# Patient Record
Sex: MALE | NOT HISPANIC OR LATINO | URBAN - METROPOLITAN AREA
[De-identification: names, ages, dates, MRNs, and addresses within clinical notes are randomized per-mention and may not be internally consistent; named-entity substitution may affect disease eponyms.]

---

## 2021-02-02 ENCOUNTER — CONVERSION ENCOUNTER (OUTPATIENT)
Dept: OTHER | Facility: HOSPITAL | Age: 56
End: 2021-02-02

## 2021-02-02 ENCOUNTER — OFFICE VISIT CONVERTED (OUTPATIENT)
Dept: CARDIOLOGY | Facility: CLINIC | Age: 56
End: 2021-02-02
Attending: INTERNAL MEDICINE

## 2021-05-10 NOTE — H&P
History and Physical      Patient Name: Davin Harris   Patient ID: [id number]   Sex: Male   YOB: 1965    Primary Care Provider: Krystle COLEMAN   Referring Provider: Krystle COLEMAN    Visit Date: February 2, 2021    Provider: Del Dobbins MD   Location: [facility]   Location Address: [address] [place] [address]  [id number]          History Of Present Illness  Consult requested by: Krystle COLEMAN   Davin Harris is a 55 year old male that I saw in the office today. He has no previous cardiac history. Over the past 1-2 months he has had intermittent dizziness described as lightheadedness, moderately intense. It has occurred at rest, somewhat occurring after rising from a seated position. He has had intermittent vague chest discomfort, left neck and jaw discomfort as well. He has also reported forgetfulness and sometimes fogginess in his thinking, low energy level, and shortness of breath with exertion. he had a stress test in July 2020 for atypical chest pain, which was negative. His baseline EKG recently showed no ischemic ST changes. He stays relatively active and goes to the gym often.   PAST MEDICAL HISTORY: Hypertension. Surgeries include back surgery and appendectomy.   PSYCHOSOCIAL HISTORY: Previous tobacco use but quit 6 years ago. Denies drinking alcohol. Moderate caffeine use. Changes in mood and depression.   FAMILY HISTORY: Positive for hypertension.   CURRENT MEDICATIONS: Lisinopril 20 mg daily; diclofenac 75 mg p.r.n. The dosage and frequency of the medications were reviewed with the patient.   ALLERGIES: No known drug allergies.       Review of Systems  · Constitutional  o Admits  o : fatigue, good general health lately  o Denies  o : recent weight changes   · Eyes  o Denies  o : double vision  · HENT  o Denies  o : hearing loss or ringing, chronic sinus problem, swollen glands in neck  · Cardiovascular  o Admits  o :  "chest pain, shortness of breath while walking or lying flat  o Denies  o : palpitations (fast, fluttering, or skipping beats), swelling (feet, ankles, hands)  · Respiratory  o Admits  o : COPD  o Denies  o : chronic or frequent cough, asthma or wheezing  · Gastrointestinal  o Denies  o : ulcers, nausea or vomiting  · Neurologic  o Admits  o : lightheaded or dizzy, headaches  o Denies  o : stroke  · Musculoskeletal  o Admits  o : back pain  o Denies  o : joint pain  · Endocrine  o Denies  o : thyroid disease, diabetes, heat or cold intolerance, excessive thirst or urination  · Heme-Lymph  o Denies  o : bleeding or bruising tendency, anemia      Vitals  Date Time BP Position Site L\R Cuff Size HR RR TEMP (F) WT  HT  BMI kg/m2 BSA m2 O2 Sat FR L/min FiO2 HC       02/02/2021 01:29 /78 Sitting    71 - R   176lbs 6oz 5'  10\" 25.31 1.99             Physical Examination  · Constitutional  o Appearance  o : Normal white male, pleasant, in no acute distress.  · Head and Face  o HEENT  o : No pallor, anicteric. Eyes normal. Moist mucous membranes.  · Neck  o Inspection/Palpation  o : Supple. No hepatosplenomegaly.  o Jugular Veins  o : No JVD. No carotid bruits.  · Respiratory  o Auscultation of Lungs  o : Clear to auscultation bilaterally. No crackles or wheezing.  · Cardiovascular  o Heart  o : S1, S2 is normally heard. No S3. No murmur, rubs, or gallops.  · Gastrointestinal  o Abdominal Examination  o : Soft, non-distended. No palpable hepatosplenomegaly. Bowel sounds heard in all four quadrants.  · Musculoskeletal  o General  o : Normal muscle tone and strength.  · Skin and Subcutaneous Tissue  o General Inspection  o : No skin rashes.  · Extremities  o Extremities  o : Warm and well perfused. Distal pulses present. No pitting pedal edema.  · EKG  o EKG  o : His EKG from January showed sinus rhythm, rate 63, no ST changes, no previous for comparison.   · Labs  o Labs  o : His most recent LDL is 118. Basic labs are " otherwise normal.      Nuclear stress test in July 2020 showed no evidence of ischemia, with low normal ejection fraction.     Primary care records reviewed.              Assessment     Constellation of symptoms, somewhat difficult to unify including dizziness, lack of energy, fatigue, somewhat forgetful, atypical chest pain.  The symptoms do not seem to be specifically related to physical exertion but more random in occurrence.  They have worsened over the past two months.  His cardiac risk factors include hypertension and former smoking.       Plan     Based on the patient's symptomatology, more definitive cardiac workup is recommended.  I have ordered an echocardiogram to rule out any structural abnormalities.  If he has no structural abnormalities, coronary angiography will be recommended for definitive assessment for coronary artery disease.  I have discussed this with the patient and he is agreeable to proceed.  We will begin with an echocardiogram and go from there. He has also been referred to Neurology as well as Neurosurgery I believe for further evaluation of some of his symptoms.  Further followup will be determined based on the patient's workup.     Thank you for this consultation.     RON Dobbins MD  CBD/rt                Electronically Signed by: Mae Kern-, Other -Author on February 16, 2021 02:12:43 PM  Electronically Co-signed by: Del Dobbins MD -Reviewer on February 17, 2021 09:04:10 AM

## 2021-05-14 VITALS
HEART RATE: 71 BPM | BODY MASS INDEX: 25.25 KG/M2 | DIASTOLIC BLOOD PRESSURE: 78 MMHG | HEIGHT: 70 IN | WEIGHT: 176.37 LBS | SYSTOLIC BLOOD PRESSURE: 114 MMHG

## 2023-05-16 NOTE — PROGRESS NOTES
Chief Complaint        Chronic Diarrhea/Delay in gastric emptying    History of Present Illness      Davin Harris is a 58 y.o. male who presents to [facility] as a new patient with a history of Flynn's esophagus, lower abdominal pain, constipation, personal history of colon polyps, and history of alcohol abuse.  Patient reports previous EGD performed 1-1/2 years ago and was diagnosed with Flynn's esophagus with no follow-up in place.  He reports last colonoscopy was performed 1-1/2 years ago and had polyps removed and was told to repeat colonoscopy in 3 years.  Patient had a gastric emptying study performed which showed delay in our 1 but normal otherwise.  Patient reports that he is on omeprazole 20 mg daily and takes about 15 times per day.  Patient was a previous alcoholic but has been sober since 2013.  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  Patient reports he lives a really healthy lifestyle now and goes to gym 3 to 7 days/week and eats very small meals.  He does report some abdominal bloating and constipation.  He uses Dulcolax every 3 to 4 days to have bowel movements and has for multiple years.  Patient is a previous smoker and quit 7 years ago.  Patient reports lower abdominal pain with constipation.  Patient denies fever, nausea, vomiting, weight loss, night sweats, melena, hematochezia, hematemesis.    No colon or EGD on file for this patient, reported at [facility] hospital performed by Dr. Simpson 1-1/2 years ago with colon polyps removed and Flynn's esophagus noted for the first time on EGD.      Results       Result Review :   The following data was reviewed by: JARED Casey on 05/19/2023                        Past Medical History       History reviewed. No pertinent past medical history.    Past Surgical History:   Procedure Laterality Date   • APPENDECTOMY     • BACK SURGERY     • COLONOSCOPY         •  "COSMETIC SURGERY      tummy tuck   • UPPER GASTROINTESTINAL ENDOSCOPY               Current Outpatient Medications:   •  aspirin 81 MG EC tablet, aspirin, Disp: , Rfl:   •  bisacodyl 5 MG EC tablet, , Disp: , Rfl:   •  diclofenac (VOLTAREN) 75 MG EC tablet, diclofenac sodium 75 mg tablet,delayed release  TAKE 1 TABLET BY MOUTH TWICE DAILY AS NEEDED, Disp: , Rfl:   •  Lactobacillus Rhamnosus, GG, (MOMMY'S BLISS PROBIOTIC 15 DAY PO), Probiotic 15 billion cell sprinkle capsule  TAKE 1 CAPSULE BY MOUTH EVERY MORNING, Disp: , Rfl:   •  lisinopril (PRINIVIL,ZESTRIL) 10 MG tablet, lisinopril 10 mg tablet, Disp: , Rfl:   •  tadalafil (CIALIS) 2.5 MG tablet, Daily., Disp: , Rfl:   •  Umeclidinium-Vilanterol (Anoro Ellipta) 62.5-25 MCG/ACT aerosol powder  inhaler, Anoro Ellipta 62.5 mcg-25 mcg/actuation powder for inhalation  Inhale 1 puff every day by inhalation route as directed., Disp: , Rfl:   •  linaclotide (Linzess) 72 MCG capsule capsule, Take 1 capsule by mouth Every Morning Before Breakfast., Disp: 8 capsule, Rfl: 0     Allergies   Allergen Reactions   • Shellfish Allergy Anaphylaxis       Family History   Problem Relation Age of Onset   • Colon cancer Neg Hx         Social History     Social History Narrative   • Not on file       Objective       Objective     Vital Signs:   /82 (BP Location: Right arm, Patient Position: Sitting, Cuff Size: Adult)   Pulse 62   Ht 180.3 cm (71\")   Wt 82.6 kg (182 lb)   SpO2 100%   BMI 25.38 kg/m²     Body mass index is 25.38 kg/m².    Physical Exam  Constitutional:       General: He is not in acute distress.     Appearance: Normal appearance. He is well-developed and normal weight.   Eyes:      Conjunctiva/sclera: Conjunctivae normal.      Pupils: Pupils are equal, round, and reactive to light.      Visual Fields: Right eye visual fields normal and left eye visual fields normal.   Cardiovascular:      Rate and Rhythm: Normal rate and regular rhythm.      Heart " sounds: Normal heart sounds.   Pulmonary:      Effort: Pulmonary effort is normal. No retractions.      Breath sounds: Normal breath sounds and air entry.      Comments: Inspection of chest: normal appearance  Abdominal:      General: Bowel sounds are normal.      Palpations: Abdomen is soft.      Tenderness: There is no abdominal tenderness.      Comments: No appreciable hepatosplenomegaly   Musculoskeletal:      Cervical back: Neck supple.      Right lower leg: No edema.      Left lower leg: No edema.   Lymphadenopathy:      Cervical: No cervical adenopathy.   Skin:     Findings: No lesion.      Comments: Turgor normal   Neurological:      Mental Status: He is alert and oriented to person, place, and time.   Psychiatric:         Mood and Affect: Mood and affect normal.              Assessment & Plan          Assessment and Plan    Diagnoses and all orders for this visit:    1. Gastroesophageal reflux disease, unspecified whether esophagitis present (Primary)    2. Abdominal bloating    3. Altered bowel habits    4. Chronic idiopathic constipation    5. Flynn's esophagus without dysplasia    6. Ex-smoker  Comments:  Quit 2016    7. History of alcohol abuse  Comments:  Sober since 2013      58-year-old male presenting the office today as a new patient with a history of Flynn's esophagus, lower abdominal pain, constipation, personal history of colon polyps, and history of alcohol abuse.   I have recommended that the patient undergo further evaluation with an EGD.  I have discussed this procedure in detail with the patient.  I have discussed the risks, benefits and alternatives.  I have discussed the risk of anesthesia, bleeding and perforation.  Patient understands these risks, benefits and alternatives and wishes to proceed.  I will schedule him at his earliest convenience.  We will begin Linzess 72 mcg daily I have educated patient on how to take this medication.  I have increased his Prilosec from 20 mg to 40  mg with the patient's history of Flynn's esophagus noted 1.5 years ago.  Patient will follow-up in the office after endoscopy.  Patient agreeable to this plan will call with any questions or concerns            Follow Up       Follow Up   Return for Follow up after endoscopy in office.  Patient was given instructions and counseling regarding his condition or for health maintenance advice. Please see specific information pulled into the AVS if appropriate.

## 2023-05-19 ENCOUNTER — OFFICE VISIT (OUTPATIENT)
Dept: GASTROENTEROLOGY | Facility: CLINIC | Age: 58
End: 2023-05-19
Payer: COMMERCIAL

## 2023-05-19 ENCOUNTER — PREP FOR SURGERY (OUTPATIENT)
Dept: OTHER | Facility: HOSPITAL | Age: 58
End: 2023-05-19
Payer: COMMERCIAL

## 2023-05-19 VITALS
HEART RATE: 62 BPM | SYSTOLIC BLOOD PRESSURE: 124 MMHG | BODY MASS INDEX: 25.48 KG/M2 | DIASTOLIC BLOOD PRESSURE: 82 MMHG | OXYGEN SATURATION: 100 % | WEIGHT: 182 LBS | HEIGHT: 71 IN

## 2023-05-19 DIAGNOSIS — R14.0 ABDOMINAL BLOATING: ICD-10-CM

## 2023-05-19 DIAGNOSIS — F10.11 HISTORY OF ALCOHOL ABUSE: ICD-10-CM

## 2023-05-19 DIAGNOSIS — K21.9 GASTROESOPHAGEAL REFLUX DISEASE, UNSPECIFIED WHETHER ESOPHAGITIS PRESENT: Primary | ICD-10-CM

## 2023-05-19 DIAGNOSIS — K22.70 BARRETT'S ESOPHAGUS WITHOUT DYSPLASIA: ICD-10-CM

## 2023-05-19 DIAGNOSIS — Z87.891 EX-SMOKER: ICD-10-CM

## 2023-05-19 DIAGNOSIS — R19.4 ALTERED BOWEL HABITS: ICD-10-CM

## 2023-05-19 DIAGNOSIS — K59.04 CHRONIC IDIOPATHIC CONSTIPATION: ICD-10-CM

## 2023-05-19 RX ORDER — UMECLIDINIUM BROMIDE AND VILANTEROL TRIFENATATE 62.5; 25 UG/1; UG/1
POWDER RESPIRATORY (INHALATION)
COMMUNITY

## 2023-05-19 RX ORDER — DICLOFENAC SODIUM 75 MG/1
TABLET, DELAYED RELEASE ORAL
COMMUNITY

## 2023-05-19 RX ORDER — ASPIRIN 81 MG/1
TABLET ORAL
COMMUNITY

## 2023-05-19 RX ORDER — LISINOPRIL 10 MG/1
TABLET ORAL
COMMUNITY

## 2023-05-19 RX ORDER — TADALAFIL 2.5 MG/1
TABLET ORAL EVERY 24 HOURS
COMMUNITY

## 2023-05-19 RX ORDER — BISACODYL 5 MG
TABLET, DELAYED RELEASE (ENTERIC COATED) ORAL
COMMUNITY
Start: 2023-05-05

## 2023-05-19 NOTE — PATIENT INSTRUCTIONS
Food Choices for Gastroesophageal Reflux Disease, Adult  When you have gastroesophageal reflux disease (GERD), the foods you eat and your eating habits are very important. Choosing the right foods can help ease your discomfort. Think about working with a food expert (dietitian) to help you make good choices.  What are tips for following this plan?  Reading food labels  Look for foods that are low in saturated fat. Foods that may help with your symptoms include:  Foods that have less than 5% of daily value (DV) of fat.  Foods that have 0 grams of trans fat.  Cooking  Do not sousa your food.  Cook your food by baking, steaming, grilling, or broiling. These are all methods that do not need a lot of fat for cooking.  To add flavor, try to use herbs that are low in spice and acidity.  Meal planning    Choose healthy foods that are low in fat, such as:  Fruits and vegetables.  Whole grains.  Low-fat dairy products.  Lean meats, fish, and poultry.  Eat small meals often instead of eating 3 large meals each day. Eat your meals slowly in a place where you are relaxed. Avoid bending over or lying down until 2-3 hours after eating.  Limit high-fat foods such as fatty meats or fried foods.  Limit your intake of fatty foods, such as oils, butter, and shortening.  Avoid the following as told by your doctor:  Foods that cause symptoms. These may be different for different people. Keep a food diary to keep track of foods that cause symptoms.  Alcohol.  Drinking a lot of liquid with meals.  Eating meals during the 2-3 hours before bed.  Lifestyle  Stay at a healthy weight. Ask your doctor what weight is healthy for you. If you need to lose weight, work with your doctor to do so safely.  Exercise for at least 30 minutes on 5 or more days each week, or as told by your doctor.  Wear loose-fitting clothes.  Do not smoke or use any products that contain nicotine or tobacco. If you need help quitting, ask your doctor.  Sleep with the head  of your bed higher than your feet. Use a wedge under the mattress or blocks under the bed frame to raise the head of the bed.  Chew sugar-free gum after meals.  What foods should eat?  Eat a healthy, well-balanced diet of fruits, vegetables, whole grains, low-fat dairy products, lean meats, fish, and poultry. Each person is different.  Foods that may cause symptoms in one person may not cause any symptoms in another person. Work with your doctor to find foods that are safe for you.  The items listed above may not be a complete list of what you can eat and drink. Contact a food expert for more options.  What foods should I avoid?  Limiting some of these foods may help in managing the symptoms of GERD. Everyone is different. Talk with a food expert or your doctor to help you find the exact foods to avoid, if any.  Fruits  Any fruits prepared with added fat. Any fruits that cause symptoms. For some people, this may include citrus fruits, such as oranges, grapefruit, pineapple, and natalie.  Vegetables  Deep-fried vegetables. French fries. Any vegetables prepared with added fat. Any vegetables that cause symptoms. For some people, this may include tomatoes and tomato products, chili peppers, onions and garlic, and horseradish.  Grains  Pastries or quick breads with added fat.  Meats and other proteins  High-fat meats, such as fatty beef or pork, hot dogs, ribs, ham, sausage, salami, and colon. Fried meat or protein, including fried fish and fried chicken. Nuts and nut butters, in large amounts.  Dairy  Whole milk and chocolate milk. Sour cream. Cream. Ice cream. Cream cheese. Milkshakes.  Fats and oils  Butter. Margarine. Shortening. Ghee.  Beverages  Coffee and tea, with or without caffeine. Carbonated beverages. Sodas. Energy drinks. Fruit juice made with acidic fruits, such as orange or grapefruit. Tomato juice. Alcoholic drinks.  Sweets and desserts  Chocolate and cocoa. Donuts.  Seasonings and condiments  Pepper.  Peppermint and spearmint. Added salt. Any condiments, herbs, or seasonings that cause symptoms. For some people, this may include mccullough, hot sauce, or vinegar-based salad dressings.  The items listed above may not be a complete list of what you should not eat and drink. Contact a food expert for more options.  Questions to ask your doctor  Diet and lifestyle changes are often the first steps that are taken to manage symptoms of GERD. If diet and lifestyle changes do not help, talk with your doctor about taking medicines.  Where to find more information  International Foundation for Gastrointestinal Disorders: aboutgerd.org  Summary  When you have GERD, food and lifestyle choices are very important in easing your symptoms.  Eat small meals often instead of 3 large meals a day. Eat your meals slowly and in a place where you are relaxed.  Avoid bending over or lying down until 2-3 hours after eating.  Limit high-fat foods such as fatty meats or fried foods.  This information is not intended to replace advice given to you by your health care provider. Make sure you discuss any questions you have with your health care provider.  Document Revised: 06/28/2021 Document Reviewed: 06/28/2021  Elsevier Patient Education © 2022 Else[employer] Inc[employer]

## 2023-06-02 NOTE — PROGRESS NOTES
Pt stopped by office stating his Linzess 72g needs a PA.  He is asking for more samples to hold him over till it gets approved..  Stated the previous samples are working well.    3 boxes of linzess 72 mcg  Lot [id number]  Exp 12/23  Given.    paresh

## 2023-07-25 ENCOUNTER — HOSPITAL ENCOUNTER (OUTPATIENT)
Facility: HOSPITAL | Age: 58
Setting detail: HOSPITAL OUTPATIENT SURGERY
Discharge: HOME | End: 2023-07-25
Attending: INTERNAL MEDICINE | Admitting: INTERNAL MEDICINE
Payer: COMMERCIAL

## 2023-07-25 ENCOUNTER — ANESTHESIA EVENT (OUTPATIENT)
Dept: GASTROENTEROLOGY | Facility: HOSPITAL | Age: 58
End: 2023-07-25
Payer: COMMERCIAL

## 2023-07-25 ENCOUNTER — ANESTHESIA (OUTPATIENT)
Dept: GASTROENTEROLOGY | Facility: HOSPITAL | Age: 58
End: 2023-07-25
Payer: COMMERCIAL

## 2023-07-25 VITALS
WEIGHT: 179.9 LBS | DIASTOLIC BLOOD PRESSURE: 89 MMHG | HEART RATE: 55 BPM | TEMPERATURE: 97.1 F | BODY MASS INDEX: 25.09 KG/M2 | RESPIRATION RATE: 14 BRPM | SYSTOLIC BLOOD PRESSURE: 129 MMHG | OXYGEN SATURATION: 100 %

## 2023-07-25 DIAGNOSIS — K21.9 GASTROESOPHAGEAL REFLUX DISEASE, UNSPECIFIED WHETHER ESOPHAGITIS PRESENT: ICD-10-CM

## 2023-07-25 PROCEDURE — 25010000002 PROPOFOL 10 MG/ML EMULSION: Performed by: NURSE ANESTHETIST, CERTIFIED REGISTERED

## 2023-07-25 PROCEDURE — 88305 TISSUE EXAM BY PATHOLOGIST: CPT | Performed by: INTERNAL MEDICINE

## 2023-07-25 RX ORDER — SODIUM CHLORIDE, SODIUM LACTATE, POTASSIUM CHLORIDE, CALCIUM CHLORIDE 600; 310; 30; 20 MG/100ML; MG/100ML; MG/100ML; MG/100ML
30 INJECTION, SOLUTION INTRAVENOUS CONTINUOUS
Status: DISCONTINUED | OUTPATIENT
Start: 2023-07-25 | End: 2023-07-25 | Stop reason: HOSPADM

## 2023-07-25 RX ORDER — PROPOFOL 10 MG/ML
VIAL (ML) INTRAVENOUS AS NEEDED
Status: DISCONTINUED | OUTPATIENT
Start: 2023-07-25 | End: 2023-07-25 | Stop reason: SURG

## 2023-07-25 RX ORDER — LIDOCAINE HYDROCHLORIDE 20 MG/ML
INJECTION, SOLUTION EPIDURAL; INFILTRATION; INTRACAUDAL; PERINEURAL AS NEEDED
Status: DISCONTINUED | OUTPATIENT
Start: 2023-07-25 | End: 2023-07-25 | Stop reason: SURG

## 2023-07-25 RX ORDER — OMEPRAZOLE 40 MG/1
40 CAPSULE, DELAYED RELEASE ORAL DAILY
Qty: 90 CAPSULE | Refills: 5 | Status: SHIPPED | OUTPATIENT
Start: 2023-07-25

## 2023-07-25 RX ADMIN — SODIUM CHLORIDE, POTASSIUM CHLORIDE, SODIUM LACTATE AND CALCIUM CHLORIDE: 600; 310; 30; 20 INJECTION, SOLUTION INTRAVENOUS at 12:52

## 2023-07-25 RX ADMIN — LIDOCAINE HYDROCHLORIDE 100 MG: 20 INJECTION, SOLUTION EPIDURAL; INFILTRATION; INTRACAUDAL; PERINEURAL at 12:52

## 2023-07-25 RX ADMIN — PROPOFOL 250 MCG/KG/MIN: 10 INJECTION, EMULSION INTRAVENOUS at 12:52

## 2023-07-25 RX ADMIN — PROPOFOL 100 MG: 10 INJECTION, EMULSION INTRAVENOUS at 12:52

## 2023-07-25 NOTE — ANESTHESIA POSTPROCEDURE EVALUATION
Patient: Davin Harris    Procedure Summary       Date: 07/25/23 Room / Location: [facility] ENDOSCOPY 2 / [facility] ENDOSCOPY    Anesthesia Start: 1250 Anesthesia Stop: 1312    Procedure: ESOPHAGOGASTRODUODENOSCOPY WITH BIOPSIES Diagnosis:       Gastroesophageal reflux disease, unspecified whether esophagitis present      (Gastroesophageal reflux disease, unspecified whether esophagitis present [K21.9])    Surgeons: Frank Taylor MD Provider: Reyes, Mirabelle, DO    Anesthesia Type: general ASA Status: 2            Anesthesia Type: general    Vitals  Vitals Value Taken Time   /71 07/25/23 1315   Temp 36.1 °C (97 °F) 07/25/23 1310   Pulse 67 07/25/23 1315   Resp 13 07/25/23 1315   SpO2 97 % 07/25/23 1315           Post Anesthesia Care and Evaluation    Patient location during evaluation: bedside  Patient participation: complete - patient participated  Level of consciousness: awake  Pain management: adequate    Airway patency: patent  PONV Status: none  Cardiovascular status: acceptable and stable  Respiratory status: acceptable  Hydration status: acceptable    Comments: An Anesthesiologist personally participated in the most demanding procedures (including induction and emergence if applicable) in the anesthesia plan, monitored the course of anesthesia administration at frequent intervals and remained physically present and available for immediate diagnosis and treatment of emergencies.

## 2023-07-25 NOTE — ANESTHESIA PREPROCEDURE EVALUATION
Anesthesia Evaluation     Patient summary reviewed and Nursing notes reviewed   no history of anesthetic complications:   NPO Solid Status: > 8 hours  NPO Liquid Status: > 2 hours           Airway   Mallampati: I  TM distance: >3 FB  Neck ROM: full  No difficulty expected  Dental          Pulmonary - normal exam    breath sounds clear to auscultation  (+) a smoker (vapes) Former, COPD (inhaler prn - does not need) mild,  Cardiovascular - normal exam  Exercise tolerance: good (4-7 METS)    Rhythm: regular  Rate: normal    (+) hypertension well controlled      Neuro/Psych- negative ROS  GI/Hepatic/Renal/Endo    (+) GERD well controlled    Musculoskeletal (-) negative ROS    Abdominal    Substance History - negative use     OB/GYN negative ob/gyn ROS         Other - negative ROS       ROS/Med Hx Other: PAT Nursing Notes unavailable.                   Anesthesia Plan    ASA 2     general     (Patient understands anesthesia not responsible for dental damage.)  intravenous induction     Anesthetic plan, risks, benefits, and alternatives have been provided, discussed and informed consent has been obtained with: patient.  Pre-procedure education provided  Use of blood products discussed with patient  Consented to blood products.    Plan discussed with CRNA.      CODE STATUS:

## 2023-07-25 NOTE — H&P
Pre Procedure History & Physical    Chief Complaint:   Gerd  fisher's    Subjective     HPI:   As above    Past Medical History:   History reviewed. No pertinent past medical history.    Past Surgical History:  Past Surgical History:   Procedure Laterality Date    APPENDECTOMY      BACK SURGERY      COLONOSCOPY          COSMETIC SURGERY      tummy tuck    UPPER GASTROINTESTINAL ENDOSCOPY             Family History:  Family History   Problem Relation Age of Onset    Colon cancer Neg Hx        Social History:   reports that he has quit smoking. His smoking use included cigarettes. He has been exposed to tobacco smoke. He has never used smokeless tobacco. He reports that he does not currently use alcohol. Drug use questions deferred to the physician.    Medications:   Medications Prior to Admission   Medication Sig Dispense Refill Last Dose    aspirin 81 MG EC tablet aspirin   Past Week    diclofenac (VOLTAREN) 75 MG EC tablet diclofenac sodium 75 mg tablet,delayed release   TAKE 1 TABLET BY MOUTH TWICE DAILY AS NEEDED   Past Week    Lactobacillus Rhamnosus, GG, (MOMMY'S Snapsheet PROBIOTIC 15 DAY PO) Probiotic 15 billion cell sprinkle capsule   TAKE 1 CAPSULE BY MOUTH EVERY MORNING   Past Week    lisinopril (PRINIVIL,ZESTRIL) 10 MG tablet lisinopril 10 mg tablet   7/25/2023    Umeclidinium-Vilanterol (Anoro Ellipta) 62.5-25 MCG/ACT aerosol powder  inhaler Anoro Ellipta 62.5 mcg-25 mcg/actuation powder for inhalation   Inhale 1 puff every day by inhalation route as directed.   Past Month    bisacodyl 5 MG EC tablet    7/23/2023    linaclotide (Linzess) 72 MCG capsule capsule Take 1 capsule by mouth Every Morning Before Breakfast. 90 capsule 1 7/23/2023    tadalafil (CIALIS) 2.5 MG tablet Daily.          Allergies:  Shellfish allergy        Objective     Blood pressure 107/86, pulse 58, temperature 97.5 °F (36.4 °C), temperature source Temporal, resp. rate 16, weight 81.6 kg (179 lb 14.3 oz), SpO2 97  %.    Physical Exam   Constitutional: Pt is oriented to person, place, and time and well-developed, well-nourished, and in no distress.   Mouth/Throat: Oropharynx is clear and moist.   Neck: Normal range of motion.   Cardiovascular: Normal rate, regular rhythm and normal heart sounds.    Pulmonary/Chest: Effort normal and breath sounds normal.   Abdominal: Soft. Nontender  Skin: Skin is warm and dry.   Psychiatric: Mood, memory, affect and judgment normal.     Assessment & Plan     Diagnosis:  Gerd  Flynn's      Anticipated Surgical Procedure:  egd    The risks, benefits, and alternatives of this procedure have been discussed with the patient or the responsible party- the patient understands and agrees to proceed.

## 2023-07-26 LAB
CYTO UR: NORMAL
LAB AP CASE REPORT: NORMAL
LAB AP CLINICAL INFORMATION: NORMAL
PATH REPORT.FINAL DX SPEC: NORMAL
PATH REPORT.GROSS SPEC: NORMAL

## 2023-08-08 NOTE — PROGRESS NOTES
Chief Complaint  Constipation     Davin Harris is a 58 y.o. male who presents to [facility]- [place] for constipation     History of present Illness  Patient presents to the office for constipation. Patient has a history of GERD, Flynn's, and gastroparesis. Patient feels that constipation has worsened in the last 6 months.  He previously failed Linzess 72 due to inadequate management of constipation therefore was increased to 145.  When taking Linzess 145 and MiraLAX daily he has a bowel movement every 4 to 5 days.  Struggles with generalized abdominal discomfort and bloating.  Feels sluggish and overall fatigued.  Denies melena and hematochezia.  Heartburn is well-controlled with omeprazole 40 mg daily.  Denies breakthrough heartburn, nausea, vomiting, epigastric pain, and dysphagia.     EGD 07/25/2023 by Dr. Taylor - Mucosa suggestive of long segment Flynn's (biopsies intestinal metaplasia), medium hiatal hernia, normal stomach and duodenum.  Stomach biopsies normal. Repeat EGD in 2 years.    Colonoscopy 7/21/2021 by Dr. 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      Past Surgical History:   Procedure Laterality Date    APPENDECTOMY      BACK SURGERY      COLONOSCOPY          COSMETIC SURGERY      tummy tuck    ENDOSCOPY N/A 7/25/2023    Procedure: ESOPHAGOGASTRODUODENOSCOPY WITH BIOPSIES;  Surgeon: Frank Taylor MD;  Location: [facility] ENDOSCOPY;  Service: Gastroenterology;  Laterality: N/A;  BARRETTS ESOPHAGUS, HIATAL HERNIA    UPPER GASTROINTESTINAL ENDOSCOPY               Current Outpatient Medications:     bisacodyl 5 MG EC tablet, , Disp: , Rfl:     diclofenac (VOLTAREN) 75 MG EC tablet, diclofenac sodium 75 mg tablet,delayed release  TAKE 1 TABLET BY MOUTH TWICE DAILY AS NEEDED, Disp: , Rfl:     linaclotide (Linzess) 145 MCG capsule capsule, Take 1 capsule by mouth Every Morning Before Breakfast., Disp:  "90 capsule, Rfl: 5    lisinopril (PRINIVIL,ZESTRIL) 10 MG tablet, lisinopril 10 mg tablet, Disp: , Rfl:     omeprazole (priLOSEC) 40 MG capsule, Take 1 capsule by mouth Daily., Disp: 90 capsule, Rfl: 5    tadalafil (CIALIS) 2.5 MG tablet, Daily., Disp: , Rfl:     Umeclidinium-Vilanterol (Anoro Ellipta) 62.5-25 MCG/ACT aerosol powder  inhaler, Anoro Ellipta 62.5 mcg-25 mcg/actuation powder for inhalation  Inhale 1 puff every day by inhalation route as directed., Disp: , Rfl:     PEG 3350-KCl-NaBcb-NaCl-NaSulf (Golytely) 227.1 g pack, Take 4,000 mL by mouth 1 (One) Time for 1 dose. Take as directed by the office for colon prep, Disp: 1 each, Rfl: 0     Allergies   Allergen Reactions    Shellfish Allergy Anaphylaxis       Family History   Problem Relation Age of Onset    Colon cancer Neg Hx         Social History     Social History Narrative    Not on file       Objective       Vital Signs:   /85 (BP Location: Right arm, Patient Position: Sitting, Cuff Size: Adult)   Pulse 61   Ht 180.3 cm (71\")   Wt 83.5 kg (184 lb)   SpO2 100%   BMI 25.66 kg/mý       Physical Exam  Constitutional:       Appearance: Normal appearance.   HENT:      Head: Normocephalic.   Cardiovascular:      Rate and Rhythm: Normal rate and regular rhythm.      Heart sounds: Normal heart sounds.   Pulmonary:      Effort: Pulmonary effort is normal.      Breath sounds: Normal breath sounds.   Abdominal:      General: Bowel sounds are normal.      Palpations: Abdomen is soft.   Skin:     General: Skin is warm and dry.   Neurological:      Mental Status: He is alert and oriented to person, place, and time. Mental status is at baseline.   Psychiatric:         Mood and Affect: Mood normal.         Behavior: Behavior normal.         Thought Content: Thought content normal.         Judgment: Judgment normal.       Result Review :                       Assessment and Plan    Diagnoses and all orders for this visit:    1. Constipation, unspecified " constipation type (Primary)  -     Case Request; Standing  -     Follow Anesthesia Guidelines / Protocol; Standing  -     Obtain Informed Consent; Standing  -     Verify NPO; Standing  -     Verify Bowel Prep Was Successful; Standing  -     Give Tap Water Enema If Bowel Prep Insufficient; Standing  -     Case Request    2. Anemia, unspecified type  -     Case Request; Standing  -     Follow Anesthesia Guidelines / Protocol; Standing  -     Obtain Informed Consent; Standing  -     Verify NPO; Standing  -     Verify Bowel Prep Was Successful; Standing  -     Give Tap Water Enema If Bowel Prep Insufficient; Standing  -     Case Request    3. Gastroesophageal reflux disease, unspecified whether esophagitis present    4. Flynn's esophagus without dysplasia    Other orders  -     PEG 3350-KCl-NaBcb-NaCl-NaSulf (Golytely) 227.1 g pack; Take 4,000 mL by mouth 1 (One) Time for 1 dose. Take as directed by the office for colon prep  Dispense: 1 each; Refill: 0      Patient presents to the office for constipation. Patient has a history of GERD, Flynn's, and gastroparesis. Patient feels that constipation has worsened in the last 6 months.  He previously failed Linzess 72 due to inadequate management of constipation therefore was increased to 145.  When taking Linzess 145 and MiraLAX daily he has a bowel movement every 4 to 5 days.  Patient will trial Linzess 290 for 1 week, he will call the office if not effective and will consider switching his medication at that time. Due to worsening constipation I have advised patient to proceed with colonoscopy, plan to complete extended bowel prep to history of inadequate prep. He will continue Omeprazole 40mg daily due to history of Flynn's and adequately controlling heartburn. Patient is agreeable to plan and will call the office with any questions or concerns.     COLONOSCOPY Surgical Risk and Benefits: Possible risk/complications, benefits, and alternatives to surgical or  invasive procedure have been explained to patient and/or legal guardian. Risks include bleeding, infection, and perforation. Patient has been evaluated and can tolerate anesthesia and/or sedation. Risk, benefits, and alternatives to anesthesia and sedation have been explained to patient and/or legal guardian.     Follow Up   No follow-ups on file.  Patient was given instructions and counseling regarding his condition or for health maintenance advice. Please see specific information pulled into the AVS if appropriate.

## 2023-08-09 ENCOUNTER — OFFICE VISIT (OUTPATIENT)
Dept: GASTROENTEROLOGY | Facility: CLINIC | Age: 58
End: 2023-08-09
Payer: COMMERCIAL

## 2023-08-09 VITALS
SYSTOLIC BLOOD PRESSURE: 127 MMHG | BODY MASS INDEX: 25.76 KG/M2 | HEART RATE: 61 BPM | DIASTOLIC BLOOD PRESSURE: 85 MMHG | WEIGHT: 184 LBS | HEIGHT: 71 IN | OXYGEN SATURATION: 100 %

## 2023-08-09 DIAGNOSIS — D64.9 ANEMIA, UNSPECIFIED TYPE: ICD-10-CM

## 2023-08-09 DIAGNOSIS — K59.00 CONSTIPATION, UNSPECIFIED CONSTIPATION TYPE: Primary | ICD-10-CM

## 2023-08-09 DIAGNOSIS — K21.9 GASTROESOPHAGEAL REFLUX DISEASE, UNSPECIFIED WHETHER ESOPHAGITIS PRESENT: ICD-10-CM

## 2023-08-09 DIAGNOSIS — K22.70 BARRETT'S ESOPHAGUS WITHOUT DYSPLASIA: ICD-10-CM

## 2023-08-09 RX ORDER — POLYETHYLENE GLYCOL 3350, SODIUM SULFATE ANHYDROUS, SODIUM BICARBONATE, SODIUM CHLORIDE, POTASSIUM CHLORIDE 227.1; 21.5; 6.36; 5.53; .754 G/L; G/L; G/L; G/L; G/L
4000 POWDER, FOR SOLUTION ORAL ONCE
Qty: 1 EACH | Refills: 0 | Status: SHIPPED | OUTPATIENT
Start: 2023-08-09 | End: 2023-08-09

## 2023-08-25 ENCOUNTER — TELEPHONE (OUTPATIENT)
Dept: GASTROENTEROLOGY | Facility: CLINIC | Age: 58
End: 2023-08-25
Payer: COMMERCIAL

## 2023-08-25 NOTE — TELEPHONE ENCOUNTER
Patient called the office and left a voicemail that his Linzess is no longer working even with the dosage increase and Miralax. Patient is interested in changing medications. I attempted to contact patient, but there was no answer. I left a voicemail for pt stating I would route message to provider about the medication change and we would contact him once we received a reply. I left the office number as a call back number. Please advise.

## 2023-08-29 RX ORDER — LUBIPROSTONE 24 UG/1
24 CAPSULE ORAL 2 TIMES DAILY WITH MEALS
Qty: 180 CAPSULE | Refills: 1 | Status: SHIPPED | OUTPATIENT
Start: 2023-08-29 | End: 2023-11-27

## 2023-08-29 NOTE — TELEPHONE ENCOUNTER
Please advise patient I have prescribed Amitiza BID. I have also looked further into his medication list, current medications are not typically linked to worsening constipation. Constipation is reported with lisinopril in about 1% of patients.

## 2023-09-07 ENCOUNTER — TELEPHONE (OUTPATIENT)
Dept: GASTROENTEROLOGY | Facility: CLINIC | Age: 58
End: 2023-09-07
Payer: COMMERCIAL

## 2023-09-07 NOTE — TELEPHONE ENCOUNTER
Received email from [facility] PAT, they were unable to speak with pt.      I left detailed vm (ok per GEOVANNA) reminding pt of his scheduled colonoscopy on 09.12.23, arrival time of 11:00 am.  Reminded of liquid diet the day prior.  Reminded of bowel prep and instructions.  Stated would also send [employer] message too. paresh

## 2023-09-08 NOTE — PRE-PROCEDURE INSTRUCTIONS
"Instructed on date and arrival time of 1100. Come to entrance \"C\". Must have  over age 18 to drive home.  May have two visitors; however, children under 12 must stay in waiting room.  Discussed clear liquid diet (no red or purple) and bowel prep.  May take medications as usual except for blood thinners, diabetic medications, and weight loss medications.  Bring list of medications.  Verbalized understanding of instructions given.  Instructed to call for questions or concerns.  "

## 2023-09-12 ENCOUNTER — ANESTHESIA (OUTPATIENT)
Dept: GASTROENTEROLOGY | Facility: HOSPITAL | Age: 58
End: 2023-09-12
Payer: COMMERCIAL

## 2023-09-12 ENCOUNTER — ANESTHESIA EVENT (OUTPATIENT)
Dept: GASTROENTEROLOGY | Facility: HOSPITAL | Age: 58
End: 2023-09-12
Payer: COMMERCIAL

## 2023-09-12 ENCOUNTER — HOSPITAL ENCOUNTER (OUTPATIENT)
Facility: HOSPITAL | Age: 58
Setting detail: HOSPITAL OUTPATIENT SURGERY
Discharge: HOME | End: 2023-09-12
Attending: INTERNAL MEDICINE | Admitting: INTERNAL MEDICINE
Payer: COMMERCIAL

## 2023-09-12 VITALS
OXYGEN SATURATION: 98 % | TEMPERATURE: 98.7 F | DIASTOLIC BLOOD PRESSURE: 87 MMHG | WEIGHT: 182.1 LBS | HEART RATE: 65 BPM | BODY MASS INDEX: 25.4 KG/M2 | SYSTOLIC BLOOD PRESSURE: 134 MMHG | RESPIRATION RATE: 15 BRPM

## 2023-09-12 PROCEDURE — 25010000002 PROPOFOL 10 MG/ML EMULSION: Performed by: NURSE ANESTHETIST, CERTIFIED REGISTERED

## 2023-09-12 RX ORDER — SODIUM CHLORIDE, SODIUM LACTATE, POTASSIUM CHLORIDE, CALCIUM CHLORIDE 600; 310; 30; 20 MG/100ML; MG/100ML; MG/100ML; MG/100ML
30 INJECTION, SOLUTION INTRAVENOUS CONTINUOUS
Status: DISCONTINUED | OUTPATIENT
Start: 2023-09-12 | End: 2023-09-12 | Stop reason: HOSPADM

## 2023-09-12 RX ORDER — PROPOFOL 10 MG/ML
VIAL (ML) INTRAVENOUS AS NEEDED
Status: DISCONTINUED | OUTPATIENT
Start: 2023-09-12 | End: 2023-09-12 | Stop reason: SURG

## 2023-09-12 RX ORDER — LIDOCAINE HYDROCHLORIDE 20 MG/ML
INJECTION, SOLUTION EPIDURAL; INFILTRATION; INTRACAUDAL; PERINEURAL AS NEEDED
Status: DISCONTINUED | OUTPATIENT
Start: 2023-09-12 | End: 2023-09-12 | Stop reason: SURG

## 2023-09-12 RX ADMIN — SODIUM CHLORIDE, POTASSIUM CHLORIDE, SODIUM LACTATE AND CALCIUM CHLORIDE: 600; 310; 30; 20 INJECTION, SOLUTION INTRAVENOUS at 13:36

## 2023-09-12 RX ADMIN — PROPOFOL 100 MG: 10 INJECTION, EMULSION INTRAVENOUS at 13:40

## 2023-09-12 RX ADMIN — LIDOCAINE HYDROCHLORIDE 50 MG: 20 INJECTION, SOLUTION EPIDURAL; INFILTRATION; INTRACAUDAL; PERINEURAL at 13:40

## 2023-09-12 RX ADMIN — PROPOFOL 175 MCG/KG/MIN: 10 INJECTION, EMULSION INTRAVENOUS at 13:40

## 2023-09-12 NOTE — ANESTHESIA PREPROCEDURE EVALUATION
Anesthesia Evaluation     Patient summary reviewed and Nursing notes reviewed   no history of anesthetic complications:   NPO Solid Status: > 8 hours  NPO Liquid Status: > 2 hours           Airway   Mallampati: I  TM distance: >3 FB  Neck ROM: full  No difficulty expected  Dental    (+) implants    Pulmonary - normal exam    breath sounds clear to auscultation  (+) a smoker (vape) Former, COPD (inhaler PRN) mild,  Cardiovascular - normal exam  Exercise tolerance: good (4-7 METS)    Rhythm: regular  Rate: normal    (+) hypertension well controlled, hyperlipidemia      Neuro/Psych- negative ROS  GI/Hepatic/Renal/Endo    (+) GERD well controlled    Musculoskeletal     (+) arthralgias, back pain  Abdominal    Substance History - negative use     OB/GYN negative ob/gyn ROS         Other - negative ROS       ROS/Med Hx Other: PAT Nursing Notes unavailable.                    Anesthesia Plan    ASA 2     general     (Total IV Anesthesia    Patient understands anesthesia not responsible for dental damage.  )  intravenous induction     Anesthetic plan, risks, benefits, and alternatives have been provided, discussed and informed consent has been obtained with: patient.    Plan discussed with CRNA.      CODE STATUS:

## 2023-09-12 NOTE — H&P
Pre Procedure History & Physical    Chief Complaint:   constipation    Subjective     HPI:   As above    Past Medical History:   Past Medical History:   Diagnosis Date    Acid reflux disease     Flynn's esophagus     Hypertension        Past Surgical History:  Past Surgical History:   Procedure Laterality Date    APPENDECTOMY      BACK SURGERY      COLONOSCOPY          COSMETIC SURGERY      tummy tuck    ENDOSCOPY N/A 7/25/2023    Procedure: ESOPHAGOGASTRODUODENOSCOPY WITH BIOPSIES;  Surgeon: Frank Taylor MD;  Location: [facility] ENDOSCOPY;  Service: Gastroenterology;  Laterality: N/A;  BARRETTS ESOPHAGUS, HIATAL HERNIA    UPPER GASTROINTESTINAL ENDOSCOPY             Family History:  Family History   Problem Relation Age of Onset    Colon cancer Neg Hx        Social History:   reports that he has quit smoking. His smoking use included cigarettes. He has been exposed to tobacco smoke. He has never used smokeless tobacco. He reports that he does not currently use alcohol. Drug use questions deferred to the physician.    Medications:   Medications Prior to Admission   Medication Sig Dispense Refill Last Dose    bisacodyl 5 MG EC tablet    9/11/2023    diclofenac (VOLTAREN) 75 MG EC tablet diclofenac sodium 75 mg tablet,delayed release   TAKE 1 TABLET BY MOUTH TWICE DAILY AS NEEDED   9/11/2023    lisinopril (PRINIVIL,ZESTRIL) 10 MG tablet lisinopril 10 mg tablet   9/12/2023    lubiprostone (Amitiza) 24 MCG capsule Take 1 capsule by mouth 2 (Two) Times a Day With Meals for 90 days. 180 capsule 1 9/11/2023    omeprazole (priLOSEC) 40 MG capsule Take 1 capsule by mouth Daily. 90 capsule 5 9/11/2023    tadalafil (CIALIS) 2.5 MG tablet Daily.       Umeclidinium-Vilanterol (Anoro Ellipta) 62.5-25 MCG/ACT aerosol powder  inhaler Anoro Ellipta 62.5 mcg-25 mcg/actuation powder for inhalation   Inhale 1 puff every day by inhalation route as directed.   More than a month       Allergies:  Shellfish  allergy        Objective     Blood pressure 145/91, pulse 56, temperature 98.2 °F (36.8 °C), temperature source Temporal, resp. rate 16, weight 82.6 kg (182 lb 1.6 oz), SpO2 97 %.    Physical Exam   Constitutional: Pt is oriented to person, place, and time and well-developed, well-nourished, and in no distress.   Mouth/Throat: Oropharynx is clear and moist.   Neck: Normal range of motion.   Cardiovascular: Normal rate, regular rhythm and normal heart sounds.    Pulmonary/Chest: Effort normal and breath sounds normal.   Abdominal: Soft. Nontender  Skin: Skin is warm and dry.   Psychiatric: Mood, memory, affect and judgment normal.     Assessment & Plan     Diagnosis:  constipation    Anticipated Surgical Procedure:  colonoscopy    The risks, benefits, and alternatives of this procedure have been discussed with the patient or the responsible party- the patient understands and agrees to proceed.

## 2023-09-12 NOTE — ANESTHESIA POSTPROCEDURE EVALUATION
Patient: Davin Harris    Procedure Summary       Date: 09/12/23 Room / Location: [facility] ENDOSCOPY 2 / [facility] ENDOSCOPY    Anesthesia Start: 1336 Anesthesia Stop: 1400    Procedure: COLONOSCOPY Diagnosis:       Constipation, unspecified constipation type      Anemia, unspecified type      (Constipation, unspecified constipation type [K59.00])      (Anemia, unspecified type [D64.9])    Surgeons: Frank Taylor MD Provider: Karan Parmar CRNA    Anesthesia Type: general ASA Status: 2            Anesthesia Type: general    Vitals  Vitals Value Taken Time   /80 09/12/23 1404   Temp 36.3 °C (97.4 °F) 09/12/23 1358   Pulse 58 09/12/23 1405   Resp 13 09/12/23 1358   SpO2 100 % 09/12/23 1405   Vitals shown include unvalidated device data.        Post Anesthesia Care and Evaluation    Post-procedure mental status: acceptable.  Pain management: satisfactory to patient    Airway patency: patent  Anesthetic complications: No anesthetic complications    Cardiovascular status: acceptable  Respiratory status: acceptable    Comments: Per chart review

## 2023-09-13 ENCOUNTER — TELEPHONE (OUTPATIENT)
Dept: GASTROENTEROLOGY | Facility: CLINIC | Age: 58
End: 2023-09-13
Payer: COMMERCIAL

## 2023-09-13 NOTE — TELEPHONE ENCOUNTER
3yr colon recall and care gap placed.  Mailed post colon letter.  paresh  
I have reviewed the patients colonoscopy report. The report showed a normal colonoscopy with melanosis.  No polyps removed or specimens collected.  Repeat colonoscopy in 3 years. Please place in recall. Send letter.     
9

## 2023-10-27 ENCOUNTER — OFFICE VISIT (OUTPATIENT)
Dept: GASTROENTEROLOGY | Facility: CLINIC | Age: 58
End: 2023-10-27
Payer: COMMERCIAL

## 2023-10-27 VITALS
WEIGHT: 181.8 LBS | HEIGHT: 71 IN | SYSTOLIC BLOOD PRESSURE: 123 MMHG | DIASTOLIC BLOOD PRESSURE: 87 MMHG | BODY MASS INDEX: 25.45 KG/M2 | OXYGEN SATURATION: 100 % | HEART RATE: 58 BPM

## 2023-10-27 DIAGNOSIS — K22.70 BARRETT'S ESOPHAGUS WITHOUT DYSPLASIA: ICD-10-CM

## 2023-10-27 DIAGNOSIS — R14.0 ABDOMINAL BLOATING: ICD-10-CM

## 2023-10-27 DIAGNOSIS — R19.4 ALTERED BOWEL HABITS: ICD-10-CM

## 2023-10-27 DIAGNOSIS — D64.9 ANEMIA, UNSPECIFIED TYPE: ICD-10-CM

## 2023-10-27 DIAGNOSIS — K21.9 GASTROESOPHAGEAL REFLUX DISEASE, UNSPECIFIED WHETHER ESOPHAGITIS PRESENT: ICD-10-CM

## 2023-10-27 DIAGNOSIS — K59.04 CHRONIC IDIOPATHIC CONSTIPATION: Primary | ICD-10-CM

## 2023-10-27 DIAGNOSIS — Z87.891 EX-SMOKER: ICD-10-CM

## 2023-10-27 DIAGNOSIS — F10.11 HISTORY OF ALCOHOL ABUSE: ICD-10-CM

## 2023-10-27 RX ORDER — LISINOPRIL 40 MG/1
TABLET ORAL
COMMUNITY
Start: 2023-10-12

## 2023-10-27 RX ORDER — LINACLOTIDE 145 UG/1
CAPSULE, GELATIN COATED ORAL
COMMUNITY
Start: 2023-10-23

## 2023-10-27 RX ORDER — SERTRALINE HYDROCHLORIDE 25 MG/1
TABLET, FILM COATED ORAL
COMMUNITY
Start: 2023-10-12

## 2023-10-27 NOTE — PROGRESS NOTES
"Chief Complaint   2m endo follow up    History of Present Illness       Davin Harris is a 58 y.o. male who presents to [facility] GASTROENTEROLOGY for follow-up after recent colonoscopy.  We have reviewed colonoscopy and EGD as well as pathology.  Patient was previously struggling with lower abdominal pain, constipation, history of colon polyps, Flynn's esophagus and a history of alcohol abuse.  Patient was started on Linzess previous office visit is 72 mcg he was then changed to Trulance but reverted back to Linzess at 145 mcg.  Patient reports that 145 mcg seems to provide a bowel movement daily but sometimes the bowel movements are loose and he does have abdominal cramping.  We reviewed patient's EGD which displayed Flynn's esophagus may be due for repeat EGD in 2025.  Patient's colonoscopy was reviewed patient be due for repeat colonoscopy in 2026.  Patient continue sobriety since 2013.  Patient continues to live a really healthy lifestyle.  Patient was a previous smoker but quit in 2015.  Patient denies fever, nausea, vomiting, weight loss, night sweats, melena, hematochezia, hematemesis.    Colonoscopy: Review of the patient's most recent colonoscopy performed by Dr. Taylor on 09.12.2023 melanosis of the colon repeat 3 years.  EGD: Review of the patient's most recent EGD performed by Dr. Taylor on 07.25.2023 medium size hiatal hernia.  Flynn's esophagus repeat EGD in 2 years.    Most recent labs on 06.08.2023  Results       Result Review :   The following data was reviewed by: JARED Casey on 10/27/2023:          Iron Profile No results found for: \"IRON\", \"TIBC\", \"LABIRON\", \"TRANSFERRIN\"  Ferritin No results found for: \"FERRITIN\"            Past Medical History       Past Medical History:   Diagnosis Date    Acid reflux disease     Flynn's esophagus     Hypertension        Past Surgical History:   Procedure Laterality Date    APPENDECTOMY      BACK SURGERY      COLONOSCOPY      " "    COLONOSCOPY N/A 9/12/2023    Procedure: COLONOSCOPY;  Surgeon: Frank Taylor MD;  Location: [facility] ENDOSCOPY;  Service: Gastroenterology;  Laterality: N/A;  MELANOSIS COLI    COSMETIC SURGERY      tummy tuck    ENDOSCOPY N/A 7/25/2023    Procedure: ESOPHAGOGASTRODUODENOSCOPY WITH BIOPSIES;  Surgeon: Frank Taylor MD;  Location: [facility] ENDOSCOPY;  Service: Gastroenterology;  Laterality: N/A;  BARRETTS ESOPHAGUS, HIATAL HERNIA    UPPER GASTROINTESTINAL ENDOSCOPY               Current Outpatient Medications:     diclofenac (VOLTAREN) 75 MG EC tablet, diclofenac sodium 75 mg tablet,delayed release  TAKE 1 TABLET BY MOUTH TWICE DAILY AS NEEDED, Disp: , Rfl:     Linzess 145 MCG capsule capsule, , Disp: , Rfl:     lisinopril (PRINIVIL,ZESTRIL) 40 MG tablet, , Disp: , Rfl:     omeprazole (priLOSEC) 40 MG capsule, Take 1 capsule by mouth Daily., Disp: 90 capsule, Rfl: 5    sertraline (ZOLOFT) 25 MG tablet, , Disp: , Rfl:     tadalafil (CIALIS) 2.5 MG tablet, Daily., Disp: , Rfl:     Umeclidinium-Vilanterol (Anoro Ellipta) 62.5-25 MCG/ACT aerosol powder  inhaler, Anoro Ellipta 62.5 mcg-25 mcg/actuation powder for inhalation  Inhale 1 puff every day by inhalation route as directed., Disp: , Rfl:     bisacodyl 5 MG EC tablet, , Disp: , Rfl:      Allergies   Allergen Reactions    Shellfish Allergy Anaphylaxis       Family History   Problem Relation Age of Onset    Colon cancer Neg Hx         Social History     Social History Narrative    Not on file       Objective     Vital Signs:   /87 (BP Location: Right arm, Patient Position: Sitting, Cuff Size: Adult)   Pulse 58   Ht 180.3 cm (70.98\")   Wt 82.5 kg (181 lb 12.8 oz)   SpO2 100%   BMI 25.37 kg/m²       Physical Exam  Constitutional:       General: He is not in acute distress.     Appearance: Normal appearance. He is well-developed and normal weight.   Eyes:      Conjunctiva/sclera: Conjunctivae normal.      Pupils: Pupils are " equal, round, and reactive to light.      Visual Fields: Right eye visual fields normal and left eye visual fields normal.   Cardiovascular:      Rate and Rhythm: Normal rate and regular rhythm.      Heart sounds: Normal heart sounds.   Pulmonary:      Effort: Pulmonary effort is normal. No retractions.      Breath sounds: Normal breath sounds and air entry.      Comments: Inspection of chest: normal appearance  Abdominal:      General: Bowel sounds are normal.      Palpations: Abdomen is soft.      Tenderness: There is no abdominal tenderness.      Comments: No appreciable hepatosplenomegaly   Musculoskeletal:      Cervical back: Neck supple.      Right lower leg: No edema.      Left lower leg: No edema.   Lymphadenopathy:      Cervical: No cervical adenopathy.   Skin:     Findings: No lesion.      Comments: Turgor normal   Neurological:      Mental Status: He is alert and oriented to person, place, and time.   Psychiatric:         Mood and Affect: Mood and affect normal.           Assessment & Plan          Assessment and Plan    Diagnoses and all orders for this visit:    1. Chronic idiopathic constipation (Primary)    2. Gastroesophageal reflux disease, unspecified whether esophagitis present    3. Flynn's esophagus without dysplasia    4. Anemia, unspecified type    5. Ex-smoker    6. Abdominal bloating    7. History of alcohol abuse    8. Altered bowel habits      58-year-old male presenting the office today  for follow-up after recent colonoscopy.  We have reviewed colonoscopy and EGD as well as pathology.  Patient was previously struggling with lower abdominal pain, constipation, history of colon polyps, Flynn's esophagus and a history of alcohol abuse.  We have reviewed patient's recent EGD and colonoscopy.  I reviewed patient's previous labs.  Patient will continue Linzess have given him samples of 72 mcg Linzess in office to see if this improves the abdominal cramping.  Patient will call the office if  he wishes to continue with 72 mcg we will send to pharmacy.  We will follow-up in 1 year.  Patient be due for repeat EGD in 2 years and repeat colonoscopy in 3 years.  Agreeable to this plan will call with any questions or concerns.          Follow Up       Follow Up   Return in about 1 year (around 10/27/2024).  Patient was given instructions and counseling regarding his condition or for health maintenance advice. Please see specific information pulled into the AVS if appropriate.

## 2023-11-08 ENCOUNTER — TELEPHONE (OUTPATIENT)
Dept: GASTROENTEROLOGY | Facility: CLINIC | Age: 58
End: 2023-11-08
Payer: COMMERCIAL

## 2023-11-08 NOTE — TELEPHONE ENCOUNTER
Pt called in stated the linzess 72 is working great and would like us to send in this dosage to pharmacy. Will send this in or paient.

## 2024-10-30 ENCOUNTER — HOSPITAL ENCOUNTER (OUTPATIENT)
Dept: GENERAL RADIOLOGY | Facility: HOSPITAL | Age: 59
Discharge: HOME | End: 2024-10-30
Admitting: NURSE PRACTITIONER
Payer: COMMERCIAL

## 2024-10-30 ENCOUNTER — TRANSCRIBE ORDERS (OUTPATIENT)
Dept: ADMINISTRATIVE | Facility: HOSPITAL | Age: 59
End: 2024-10-30
Payer: COMMERCIAL

## 2024-10-30 DIAGNOSIS — R05.9 COUGH, UNSPECIFIED TYPE: Primary | ICD-10-CM

## 2024-10-30 DIAGNOSIS — R05.9 COUGH, UNSPECIFIED TYPE: ICD-10-CM

## 2024-10-30 PROCEDURE — 71046 X-RAY EXAM CHEST 2 VIEWS: CPT

## (undated) DEVICE — Device: Brand: DEFENDO AIR/WATER/SUCTION AND BIOPSY VALVE

## (undated) DEVICE — LINER SURG CANSTR SXN S/RIGD 1500CC

## (undated) DEVICE — CONN JET HYDRA H20 AUXILIARY DISP

## (undated) DEVICE — SOL IRRG H2O PL/BG 1000ML STRL

## (undated) DEVICE — Device

## (undated) DEVICE — BLCK/BITE BLOX WO/DENTL/RIM W/STRAP 54F

## (undated) DEVICE — SINGLE-USE BIOPSY FORCEPS: Brand: RADIAL JAW 4

## (undated) DEVICE — SOLIDIFIER LIQLOC PLS 1500CC BT